# Patient Record
Sex: FEMALE | Race: OTHER | URBAN - METROPOLITAN AREA
[De-identification: names, ages, dates, MRNs, and addresses within clinical notes are randomized per-mention and may not be internally consistent; named-entity substitution may affect disease eponyms.]

---

## 2023-09-10 ENCOUNTER — OFFICE VISIT (OUTPATIENT)
Dept: FAMILY MEDICINE CLINIC | Facility: CLINIC | Age: 23
End: 2023-09-10
Payer: COMMERCIAL

## 2023-09-10 VITALS
RESPIRATION RATE: 18 BRPM | WEIGHT: 185 LBS | OXYGEN SATURATION: 100 % | HEART RATE: 95 BPM | HEIGHT: 62 IN | TEMPERATURE: 100 F | BODY MASS INDEX: 34.04 KG/M2 | DIASTOLIC BLOOD PRESSURE: 95 MMHG | SYSTOLIC BLOOD PRESSURE: 137 MMHG

## 2023-09-10 DIAGNOSIS — H65.93 MEE (MIDDLE EAR EFFUSION), BILATERAL: ICD-10-CM

## 2023-09-10 DIAGNOSIS — H92.02 OTALGIA OF LEFT EAR: Primary | ICD-10-CM

## 2023-09-10 PROCEDURE — 3075F SYST BP GE 130 - 139MM HG: CPT

## 2023-09-10 PROCEDURE — 99203 OFFICE O/P NEW LOW 30 MIN: CPT

## 2023-09-10 PROCEDURE — 3008F BODY MASS INDEX DOCD: CPT

## 2023-09-10 PROCEDURE — 3080F DIAST BP >= 90 MM HG: CPT

## 2023-09-10 RX ORDER — PHENTERMINE HYDROCHLORIDE 15 MG/1
15 CAPSULE ORAL DAILY
COMMUNITY

## 2023-09-10 RX ORDER — NORGESTIMATE AND ETHINYL ESTRADIOL 7DAYSX3 28
KIT ORAL
COMMUNITY

## 2023-09-13 ENCOUNTER — OFFICE VISIT (OUTPATIENT)
Dept: FAMILY MEDICINE CLINIC | Facility: CLINIC | Age: 23
End: 2023-09-13
Payer: COMMERCIAL

## 2023-09-13 VITALS
DIASTOLIC BLOOD PRESSURE: 96 MMHG | RESPIRATION RATE: 18 BRPM | OXYGEN SATURATION: 96 % | BODY MASS INDEX: 34.04 KG/M2 | SYSTOLIC BLOOD PRESSURE: 126 MMHG | HEIGHT: 62 IN | HEART RATE: 92 BPM | TEMPERATURE: 97 F | WEIGHT: 185 LBS

## 2023-09-13 DIAGNOSIS — H69.93 EUSTACHIAN TUBE DYSFUNCTION, BILATERAL: Primary | ICD-10-CM

## 2023-09-13 PROCEDURE — 3074F SYST BP LT 130 MM HG: CPT | Performed by: NURSE PRACTITIONER

## 2023-09-13 PROCEDURE — 99213 OFFICE O/P EST LOW 20 MIN: CPT | Performed by: NURSE PRACTITIONER

## 2023-09-13 PROCEDURE — 3080F DIAST BP >= 90 MM HG: CPT | Performed by: NURSE PRACTITIONER

## 2023-09-13 PROCEDURE — 3008F BODY MASS INDEX DOCD: CPT | Performed by: NURSE PRACTITIONER

## 2023-09-13 RX ORDER — METHYLPREDNISOLONE 4 MG/1
TABLET ORAL
Qty: 1 EACH | Refills: 0 | Status: SHIPPED | OUTPATIENT
Start: 2023-09-13

## 2023-11-02 ENCOUNTER — HOSPITAL ENCOUNTER (EMERGENCY)
Facility: HOSPITAL | Age: 23
Discharge: HOME | End: 2023-11-02
Attending: EMERGENCY MEDICINE
Payer: COMMERCIAL

## 2023-11-02 ENCOUNTER — APPOINTMENT (OUTPATIENT)
Dept: CT IMAGING | Facility: HOSPITAL | Age: 23
End: 2023-11-02
Attending: EMERGENCY MEDICINE
Payer: COMMERCIAL

## 2023-11-02 ENCOUNTER — OFFICE VISIT (OUTPATIENT)
Dept: FAMILY MEDICINE CLINIC | Facility: CLINIC | Age: 23
End: 2023-11-02
Payer: COMMERCIAL

## 2023-11-02 VITALS
RESPIRATION RATE: 21 BRPM | WEIGHT: 194 LBS | OXYGEN SATURATION: 98 % | HEART RATE: 119 BPM | TEMPERATURE: 99 F | BODY MASS INDEX: 35 KG/M2 | DIASTOLIC BLOOD PRESSURE: 101 MMHG | SYSTOLIC BLOOD PRESSURE: 141 MMHG

## 2023-11-02 VITALS
HEIGHT: 62 IN | SYSTOLIC BLOOD PRESSURE: 130 MMHG | HEART RATE: 112 BPM | RESPIRATION RATE: 16 BRPM | OXYGEN SATURATION: 97 % | WEIGHT: 194 LBS | TEMPERATURE: 100 F | BODY MASS INDEX: 35.7 KG/M2 | DIASTOLIC BLOOD PRESSURE: 90 MMHG

## 2023-11-02 DIAGNOSIS — N72 CERVICITIS: Primary | ICD-10-CM

## 2023-11-02 DIAGNOSIS — N39.0 URINARY TRACT INFECTION WITH FEVER: Primary | ICD-10-CM

## 2023-11-02 DIAGNOSIS — R10.9 ACUTE LEFT FLANK PAIN: ICD-10-CM

## 2023-11-02 DIAGNOSIS — R39.9 URINARY SYMPTOM OR SIGN: ICD-10-CM

## 2023-11-02 DIAGNOSIS — R03.0 ELEVATED BLOOD PRESSURE READING: ICD-10-CM

## 2023-11-02 DIAGNOSIS — R30.0 DYSURIA: ICD-10-CM

## 2023-11-02 LAB
ANION GAP SERPL CALC-SCNC: 9 MMOL/L (ref 0–18)
APPEARANCE: CLEAR
B-HCG UR QL: NEGATIVE
BASOPHILS # BLD AUTO: 0.03 X10(3) UL (ref 0–0.2)
BASOPHILS NFR BLD AUTO: 0.4 %
BILIRUB UR QL: NEGATIVE
BILIRUBIN: NEGATIVE
BUN BLD-MCNC: <5 MG/DL (ref 9–23)
CALCIUM BLD-MCNC: 9.3 MG/DL (ref 8.7–10.4)
CHLORIDE SERPL-SCNC: 104 MMOL/L (ref 98–112)
CLARITY UR: CLEAR
CO2 SERPL-SCNC: 25 MMOL/L (ref 21–32)
CREAT BLD-MCNC: 0.77 MG/DL
DEPRECATED RDW RBC AUTO: 40 FL (ref 35.1–46.3)
EGFRCR SERPLBLD CKD-EPI 2021: 110 ML/MIN/1.73M2 (ref 60–?)
EOSINOPHIL # BLD AUTO: 0.02 X10(3) UL (ref 0–0.7)
EOSINOPHIL NFR BLD AUTO: 0.3 %
ERYTHROCYTE [DISTWIDTH] IN BLOOD BY AUTOMATED COUNT: 13 % (ref 11–15)
GLUCOSE (URINE DIPSTICK): NEGATIVE MG/DL
GLUCOSE BLD-MCNC: 88 MG/DL (ref 70–99)
GLUCOSE UR-MCNC: NORMAL MG/DL
HCT VFR BLD AUTO: 39.7 %
HGB BLD-MCNC: 13.2 G/DL
IMM GRANULOCYTES # BLD AUTO: 0.01 X10(3) UL (ref 0–1)
IMM GRANULOCYTES NFR BLD: 0.1 %
KETONES (URINE DIPSTICK): NEGATIVE MG/DL
KETONES UR-MCNC: NEGATIVE MG/DL
LEUKOCYTE ESTERASE UR QL STRIP.AUTO: 500
LYMPHOCYTES # BLD AUTO: 1.46 X10(3) UL (ref 1–4)
LYMPHOCYTES NFR BLD AUTO: 20 %
MCH RBC QN AUTO: 28 PG (ref 26–34)
MCHC RBC AUTO-ENTMCNC: 33.2 G/DL (ref 31–37)
MCV RBC AUTO: 84.1 FL
MONOCYTES # BLD AUTO: 0.83 X10(3) UL (ref 0.1–1)
MONOCYTES NFR BLD AUTO: 11.4 %
MULTISTIX LOT#: ABNORMAL NUMERIC
NEUTROPHILS # BLD AUTO: 4.96 X10 (3) UL (ref 1.5–7.7)
NEUTROPHILS # BLD AUTO: 4.96 X10(3) UL (ref 1.5–7.7)
NEUTROPHILS NFR BLD AUTO: 67.8 %
NITRITE UR QL STRIP.AUTO: NEGATIVE
NITRITE, URINE: NEGATIVE
PH UR: 6.5 [PH] (ref 5–8)
PH, URINE: 6.5 (ref 4.5–8)
PLATELET # BLD AUTO: 276 10(3)UL (ref 150–450)
POTASSIUM SERPL-SCNC: 3.8 MMOL/L (ref 3.5–5.1)
PROT UR-MCNC: NEGATIVE MG/DL
PROTEIN (URINE DIPSTICK): NEGATIVE MG/DL
RBC # BLD AUTO: 4.72 X10(6)UL
SODIUM SERPL-SCNC: 138 MMOL/L (ref 136–145)
SP GR UR STRIP: 1.01 (ref 1–1.03)
SPECIFIC GRAVITY: <=1.005 (ref 1–1.03)
URINE-COLOR: YELLOW
UROBILINOGEN UR STRIP-ACNC: NORMAL
UROBILINOGEN,SEMI-QN: 0.2 MG/DL (ref 0–1.9)
WBC # BLD AUTO: 7.3 X10(3) UL (ref 4–11)

## 2023-11-02 PROCEDURE — 96360 HYDRATION IV INFUSION INIT: CPT

## 2023-11-02 PROCEDURE — 99284 EMERGENCY DEPT VISIT MOD MDM: CPT

## 2023-11-02 PROCEDURE — 96372 THER/PROPH/DIAG INJ SC/IM: CPT

## 2023-11-02 PROCEDURE — 87491 CHLMYD TRACH DNA AMP PROBE: CPT | Performed by: EMERGENCY MEDICINE

## 2023-11-02 PROCEDURE — 87591 N.GONORRHOEAE DNA AMP PROB: CPT | Performed by: EMERGENCY MEDICINE

## 2023-11-02 PROCEDURE — 80048 BASIC METABOLIC PNL TOTAL CA: CPT | Performed by: EMERGENCY MEDICINE

## 2023-11-02 PROCEDURE — 74176 CT ABD & PELVIS W/O CONTRAST: CPT | Performed by: EMERGENCY MEDICINE

## 2023-11-02 PROCEDURE — 96361 HYDRATE IV INFUSION ADD-ON: CPT

## 2023-11-02 PROCEDURE — 81025 URINE PREGNANCY TEST: CPT

## 2023-11-02 PROCEDURE — 99285 EMERGENCY DEPT VISIT HI MDM: CPT

## 2023-11-02 PROCEDURE — 85025 COMPLETE CBC W/AUTO DIFF WBC: CPT | Performed by: EMERGENCY MEDICINE

## 2023-11-02 PROCEDURE — 81001 URINALYSIS AUTO W/SCOPE: CPT | Performed by: EMERGENCY MEDICINE

## 2023-11-02 RX ORDER — DOXYCYCLINE HYCLATE 100 MG/1
100 CAPSULE ORAL 2 TIMES DAILY
Qty: 14 CAPSULE | Refills: 0 | Status: SHIPPED | OUTPATIENT
Start: 2023-11-02 | End: 2023-11-09

## 2023-11-02 NOTE — DISCHARGE INSTRUCTIONS
Take antibiotics as directed  Use condoms  Follow-up with your gynecologist  Return if increased pain fever vomiting

## 2023-11-02 NOTE — ED INITIAL ASSESSMENT (HPI)
Patient with c/o dysuria, urinary urgency, urinary frequency since Monday. Patient states she is now having left flank pain and fever.

## 2023-11-03 LAB
C TRACH DNA SPEC QL NAA+PROBE: NEGATIVE
N GONORRHOEA DNA SPEC QL NAA+PROBE: NEGATIVE

## 2023-11-05 ENCOUNTER — TELEPHONE (OUTPATIENT)
Dept: FAMILY MEDICINE CLINIC | Facility: CLINIC | Age: 23
End: 2023-11-05

## 2023-11-05 DIAGNOSIS — Z02.9 ENCOUNTERS FOR ADMINISTRATIVE PURPOSE: Primary | ICD-10-CM

## 2023-11-05 NOTE — TELEPHONE ENCOUNTER
Patient called [phone] to get work note for 11/2/23 and 11/3/23. Seen on 11/2/23, states still had fever on 11/3/23 and requesting work note for 2nd and 3rd. Note provided.

## 2024-02-14 ENCOUNTER — OFFICE VISIT (OUTPATIENT)
Dept: FAMILY MEDICINE CLINIC | Facility: CLINIC | Age: 24
End: 2024-02-14
Payer: COMMERCIAL

## 2024-02-14 VITALS
RESPIRATION RATE: 16 BRPM | BODY MASS INDEX: 36.44 KG/M2 | SYSTOLIC BLOOD PRESSURE: 124 MMHG | OXYGEN SATURATION: 96 % | TEMPERATURE: 99 F | HEART RATE: 96 BPM | DIASTOLIC BLOOD PRESSURE: 90 MMHG | WEIGHT: 198 LBS | HEIGHT: 62 IN

## 2024-02-14 DIAGNOSIS — J02.9 SORE THROAT: ICD-10-CM

## 2024-02-14 DIAGNOSIS — H69.91 EUSTACHIAN TUBE DISORDER, RIGHT: Primary | ICD-10-CM

## 2024-02-14 LAB
CONTROL LINE PRESENT WITH A CLEAR BACKGROUND (YES/NO): YES YES/NO
KIT LOT #: NORMAL NUMERIC
STREP GRP A CUL-SCR: NEGATIVE

## 2024-02-14 PROCEDURE — 3008F BODY MASS INDEX DOCD: CPT | Performed by: NURSE PRACTITIONER

## 2024-02-14 PROCEDURE — 87880 STREP A ASSAY W/OPTIC: CPT | Performed by: NURSE PRACTITIONER

## 2024-02-14 PROCEDURE — 99213 OFFICE O/P EST LOW 20 MIN: CPT | Performed by: NURSE PRACTITIONER

## 2024-02-14 PROCEDURE — 3080F DIAST BP >= 90 MM HG: CPT | Performed by: NURSE PRACTITIONER

## 2024-02-14 PROCEDURE — 3074F SYST BP LT 130 MM HG: CPT | Performed by: NURSE PRACTITIONER

## 2024-02-14 RX ORDER — FLUTICASONE PROPIONATE 50 MCG
2 SPRAY, SUSPENSION (ML) NASAL DAILY
Qty: 1 EACH | Refills: 0 | Status: SHIPPED | OUTPATIENT
Start: 2024-02-14

## 2024-02-14 RX ORDER — CETIRIZINE HYDROCHLORIDE 10 MG/1
10 TABLET ORAL DAILY
Qty: 30 TABLET | Refills: 0 | Status: SHIPPED | OUTPATIENT
Start: 2024-02-14

## 2024-02-14 RX ORDER — NAPROXEN 500 MG/1
500 TABLET ORAL 2 TIMES DAILY WITH MEALS
COMMUNITY
Start: 2024-01-31 | End: 2024-03-01

## 2024-02-14 NOTE — PROGRESS NOTES
CHIEF COMPLAINT:     Chief Complaint   Patient presents with    Sore Throat     ST last week Tuesday, gone by Oksana Weeks returned yesterday and now has runny nose as well, R lymph nodes swollen   Denies fever, body aches, chills  Nothing OTC  No recent Covid test        HPI:   Nathalie Harper is a 23 year old female who presents for upper respiratory symptoms for  1 days. Patient reports sore throat, congestion, ear pain, denies fever, denies cough. Symptoms have been lingering since onset.  Treating symptoms with nothing.  No COVID testing art home, received COVID vaccines. Right ear pressure.     Current Outpatient Medications   Medication Sig Dispense Refill    naproxen 500 MG Oral Tab Take 1 tablet (500 mg total) by mouth 2 (two) times daily with meals.      fluticasone propionate 50 MCG/ACT Nasal Suspension 2 sprays by Each Nare route daily. 1 each 0    cetirizine 10 MG Oral Tab Take 1 tablet (10 mg total) by mouth daily. 30 tablet 0    TRI-SPRINTEC 0.18/0.215/0.25 MG-35 MCG Oral Tab       Phentermine HCl 15 MG Oral Cap Take 1 capsule (15 mg total) by mouth daily.        History reviewed. No pertinent past medical history.   History reviewed. No pertinent surgical history.      Social History     Socioeconomic History    Marital status: Single   Tobacco Use    Smoking status: Never         REVIEW OF SYSTEMS:   GENERAL: normal appetite  SKIN: no rashes or abnormal skin lesions  HEENT: See HPI  LUNGS: See HPI  CARDIOVASCULAR: denies chest pain or palpitations   GI: denies N/V/C or abdominal pain      EXAM:   /90   Pulse 96   Temp 99.3 °F (37.4 °C)   Resp 16   Ht 5' 2\" (1.575 m)   Wt 198 lb (89.8 kg)   LMP 01/31/2024   SpO2 96%   BMI 36.21 kg/m²   GENERAL: well developed, well nourished,in no apparent distress  SKIN: no rashes,no suspicious lesions  HEAD: atraumatic, normocephalic.  No tenderness on palpation of  sinuses  EYES: conjunctiva clear, EOM intact  EARS: TM's WNL, no bulging, no retraction,  positive fluid, bony landmarks present  NOSE: Nostrils patent, no nasal discharge, nasal mucosa    THROAT: Oral mucosa pink, moist. Posterior pharynx is non erythematous. no exudates. Tonsils 1/4.    NECK: Supple, non-tender  LUNGS: clear to auscultation bilaterally, no wheezes or rhonchi. Breathing is non labored.  CARDIO: RRR without murmur  EXTREMITIES: no cyanosis, clubbing or edema  LYMPH:  No lymphadenopathy.        ASSESSMENT AND PLAN:   Nathalie Harper is a 23 year old female who presents with upper respiratory symptoms that are consistent with    ASSESSMENT:   Encounter Diagnoses   Name Primary?    Eustachian tube disorder, right Yes    Sore throat        PLAN: Negative rapid strep. Exam not consistent with strep throat. Discussed eustachian tube dysfunction, recommend starting daily Flonase and zyrtec to reduce fluid and allow for better drainage. Meds as below.  Comfort care as described in Patient Instructions    Meds & Refills for this Visit:  Requested Prescriptions     Signed Prescriptions Disp Refills    fluticasone propionate 50 MCG/ACT Nasal Suspension 1 each 0     Si sprays by Each Nare route daily.    cetirizine 10 MG Oral Tab 30 tablet 0     Sig: Take 1 tablet (10 mg total) by mouth daily.     Risks, benefits, and side effects of medication explained and discussed.    The patient indicates understanding of these issues and agrees to the plan.  The patient is asked to f/u with PCP if sx's persist or worsen.      Patient Instructions   Eustachian tube problems  Written by the doctors and editors at [employer]     What is the eustachian tube? -- The eustachian tube is a tube that connects the middle ear (the part of the ear behind the eardrum) to the back of the nose and throat       Normally, the eustachian tube helps keep the air pressure inside the middle ear the same as the air pressure outside the middle ear. If there is a problem with the eustachian tube, the air pressure inside the middle  ear won’t be the same as the air pressure outside it. This can damage the middle ear and cause ear pain, hearing loss, and other symptoms. “Ear barotrauma” is the medical term for when people have symptoms or damage in the middle ear because of air pressure differences.    Most eustachian tube problems are not serious. They usually last only a short time and get better on their own.    But eustachian tube problems sometimes lead to serious problems, such as:    ?A middle ear infection  ?A torn eardrum  ?Hearing loss  Long-term hearing loss from eustachian tube problems can also lead to language or speech problems in children.    What causes eustachian tube problems? -- Common causes of eustachian tube problems are:    ?Illnesses or conditions that make the eustachian tubes swollen or inflamed - These include colds, allergies, ear infections, or sinus infections. The sinuses are hollow areas in the bones of the face.  ?Sudden air pressure changes - Sudden air pressure changes can happen when people fly in an airplane, scuba dive, or drive in the mountains.  ?Growths that block the eustachian tube  ?Being born with an abnormal eustachian tube    What are the symptoms of a eustachian tube problem? -- Common symptoms of a eustachian tube problem include:    ?Ear pain  ?Feeling pressure or fullness in the ear  ?Trouble hearing  ?Ringing in the ear  ?Feeling dizzy  Should I see a doctor or nurse? -- See your doctor or nurse if your symptoms are severe, get worse, or if they don’t go away after a few days.    Will I need tests? -- Probably not. Your doctor or nurse should be able to tell if you have a eustachian tube problem by learning about your symptoms and doing an exam.    If your symptoms are severe or last for a long time, your doctor or nurse might:    ?Have you see a special kind of doctor called an ear, nose, and throat doctor  ?Do tests to check your hearing  ?Do an imaging test - Imaging tests can create  pictures of the inside of the body.    How are eustachian tube problems treated? -- Treatment depends on what’s causing the eustachian tube problem. Depending on your individual situation, your doctor might treat you with one or more of the following:    ?Nose sprays   -Nasal steroids such as Flonase or Nasonex   - OTC nasal sprays like Afrin (oxymetazoline) can be used every 12 hours for  NO MORE than 3 days.  Longer use leads to worsening congestion and blood  pressure issues. This medication should be avoided in patients with high blood  pressure  ?Antihistamines - These medicines are usually used to treat allergies. They help stop itching, sneezing, and runny nose symptoms. Examples: Claritin, Allegra, Zyrtec.   ?Decongestants - These medicines can help with stuffy nose symptoms. These should not be used if patient has underlying blood pressure issues (high blood pressure etc.)   -Sudafed (pseudoephedrine)- according to package instructions.     ?Antibiotic medicines - Antibiotics are not needed to treat eustachian tube problems. But if you have an infection caused by bacteria in addition to your eustachian tube problems, your doctor can treat it with antibiotics.  ?Surgery - Most people do not need surgery for eustachian tube problems. But people might need surgery if their symptoms don’t get better with medicines or they have severe or long-term symptoms.

## 2024-02-14 NOTE — PATIENT INSTRUCTIONS
Eustachian tube problems  Written by the doctors and editors at [facility]     What is the eustachian tube? -- The eustachian tube is a tube that connects the middle ear (the part of the ear behind the eardrum) to the back of the nose and throat       Normally, the eustachian tube helps keep the air pressure inside the middle ear the same as the air pressure outside the middle ear. If there is a problem with the eustachian tube, the air pressure inside the middle ear won’t be the same as the air pressure outside it. This can damage the middle ear and cause ear pain, hearing loss, and other symptoms. “Ear barotrauma” is the medical term for when people have symptoms or damage in the middle ear because of air pressure differences.    Most eustachian tube problems are not serious. They usually last only a short time and get better on their own.    But eustachian tube problems sometimes lead to serious problems, such as:    ?A middle ear infection  ?A torn eardrum  ?Hearing loss  Long-term hearing loss from eustachian tube problems can also lead to language or speech problems in children.    What causes eustachian tube problems? -- Common causes of eustachian tube problems are:    ?Illnesses or conditions that make the eustachian tubes swollen or inflamed - These include colds, allergies, ear infections, or sinus infections. The sinuses are hollow areas in the bones of the face.  ?Sudden air pressure changes - Sudden air pressure changes can happen when people fly in an airplane, scuba dive, or drive in the mountains.  ?Growths that block the eustachian tube  ?Being born with an abnormal eustachian tube    What are the symptoms of a eustachian tube problem? -- Common symptoms of a eustachian tube problem include:    ?Ear pain  ?Feeling pressure or fullness in the ear  ?Trouble hearing  ?Ringing in the ear  ?Feeling dizzy  Should I see a doctor or nurse? -- See your doctor or nurse if your symptoms are severe, get worse,  or if they don’t go away after a few days.    Will I need tests? -- Probably not. Your doctor or nurse should be able to tell if you have a eustachian tube problem by learning about your symptoms and doing an exam.    If your symptoms are severe or last for a long time, your doctor or nurse might:    ?Have you see a special kind of doctor called an ear, nose, and throat doctor  ?Do tests to check your hearing  ?Do an imaging test - Imaging tests can create pictures of the inside of the body.    How are eustachian tube problems treated? -- Treatment depends on what’s causing the eustachian tube problem. Depending on your individual situation, your doctor might treat you with one or more of the following:    ?Nose sprays   -Nasal steroids such as Flonase or Nasonex   - OTC nasal sprays like Afrin (oxymetazoline) can be used every 12 hours for  NO MORE than 3 days.  Longer use leads to worsening congestion and blood  pressure issues. This medication should be avoided in patients with high blood  pressure  ?Antihistamines - These medicines are usually used to treat allergies. They help stop itching, sneezing, and runny nose symptoms. Examples: Claritin, Allegra, Zyrtec.   ?Decongestants - These medicines can help with stuffy nose symptoms. These should not be used if patient has underlying blood pressure issues (high blood pressure etc.)   -Sudafed (pseudoephedrine)- according to package instructions.     ?Antibiotic medicines - Antibiotics are not needed to treat eustachian tube problems. But if you have an infection caused by bacteria in addition to your eustachian tube problems, your doctor can treat it with antibiotics.  ?Surgery - Most people do not need surgery for eustachian tube problems. But people might need surgery if their symptoms don’t get better with medicines or they have severe or long-term symptoms.

## 2024-06-24 ENCOUNTER — HOSPITAL ENCOUNTER (OUTPATIENT)
Age: 24
Discharge: HOME | End: 2024-06-24

## 2024-06-24 VITALS
SYSTOLIC BLOOD PRESSURE: 150 MMHG | HEART RATE: 107 BPM | DIASTOLIC BLOOD PRESSURE: 102 MMHG | OXYGEN SATURATION: 99 % | TEMPERATURE: 97 F | RESPIRATION RATE: 19 BRPM

## 2024-06-24 DIAGNOSIS — U07.1 COVID: Primary | ICD-10-CM

## 2024-06-24 LAB
S PYO AG THROAT QL IA.RAPID: NEGATIVE
SARS-COV-2 RNA RESP QL NAA+PROBE: DETECTED

## 2024-06-24 PROCEDURE — 99213 OFFICE O/P EST LOW 20 MIN: CPT

## 2024-06-24 PROCEDURE — 99212 OFFICE O/P EST SF 10 MIN: CPT

## 2024-06-24 PROCEDURE — 87651 STREP A DNA AMP PROBE: CPT | Performed by: NURSE PRACTITIONER

## 2024-06-24 NOTE — ED PROVIDER NOTES
Patient Seen in: [facility]      History     Chief Complaint   Patient presents with    Cough     Start of Bronchitis possibly?Neck pain - Entered by patient    Sore Throat     Stated Complaint: Cough - Start of Bronchitis possibly?    Subjective:   HPI    24-year-old female presents with scratchy throat and cough that started last night.  She is afebrile.  She appears in no acute distress.    Objective:   No pertinent past medical history.            No pertinent past surgical history.              No pertinent social history.            Review of Systems    Positive for stated complaint: Cough - Start of Bronchitis possibly?  Other systems are as noted in HPI.  Constitutional and vital signs reviewed.      All other systems reviewed and negative except as noted above.    Physical Exam     ED Triage Vitals [06/24/24 1534]   BP (!) 150/102   Pulse 107   Resp 19   Temp 97.4 °F (36.3 °C)   Temp src Temporal   SpO2 99 %   O2 Device (S) None (Room air)       Current Vitals:   Vital Signs  BP: (!) 150/102  Pulse: 107  Resp: 19  Temp: 97.4 °F (36.3 °C)  Temp src: Temporal    Oxygen Therapy  SpO2: 99 %  O2 Device: (S) None (Room air)            Physical Exam  Vitals reviewed.   Constitutional:       General: She is not in acute distress.     Appearance: She is not ill-appearing.   HENT:      Right Ear: Tympanic membrane and ear canal normal.      Left Ear: Tympanic membrane and ear canal normal.      Mouth/Throat:      Mouth: Mucous membranes are moist. No oral lesions.      Pharynx: Uvula midline. Posterior oropharyngeal erythema present. No pharyngeal swelling, oropharyngeal exudate or uvula swelling.      Tonsils: No tonsillar exudate or tonsillar abscesses.   Cardiovascular:      Rate and Rhythm: Regular rhythm. Tachycardia present.   Pulmonary:      Effort: Pulmonary effort is normal.      Breath sounds: Normal breath sounds.   Musculoskeletal:      Cervical back: Normal range of motion and neck supple.    Lymphadenopathy:      Cervical: No cervical adenopathy.   Skin:     General: Skin is warm and dry.   Neurological:      General: No focal deficit present.      Mental Status: She is alert and oriented to person, place, and time.   Psychiatric:         Mood and Affect: Mood normal.         Behavior: Behavior normal.               ED Course     Labs Reviewed   RAPID SARS-COV-2 BY PCR - Abnormal; Notable for the following components:       Result Value    Rapid SARS-CoV-2 by PCR Detected (*)     All other components within normal limits   RAPID STREP A - Normal                      MDM                                         Medical Decision Making  24-year-old female presents with sore throat and cough that started last night.  Differential diagnosis includes viral upper respiratory infection, strep pharyngitis, viral pharyngitis, influenza, COVID.  Patient has unremarkable exam.  POC strep is negative.  POC COVID is positive.  Results were discussed with patient.  She was given printed instructions for care of COVID symptoms.  She was also given a work note to return July 1.    Amount and/or Complexity of Data Reviewed  Labs: ordered.     Details: POC covid is positive  POC strep is negative    Risk  OTC drugs.        Disposition and Plan     Clinical Impression:  1. COVID         Disposition:  Discharge  6/24/2024  4:25 pm    Follow-up:  Aamir Botello  [address] [place] [id number] [phone]      If symptoms worsen          Medications Prescribed:  Discharge Medication List as of 6/24/2024  4:26 PM

## 2024-09-05 ENCOUNTER — HOSPITAL ENCOUNTER (OUTPATIENT)
Dept: CT IMAGING | Age: 24
Discharge: HOME | End: 2024-09-05
Attending: OBSTETRICS & GYNECOLOGY
Payer: COMMERCIAL

## 2024-09-05 DIAGNOSIS — R10.9 ABDOMINAL PAIN: ICD-10-CM

## 2024-09-05 LAB
CREAT BLD-MCNC: 0.8 MG/DL
EGFRCR SERPLBLD CKD-EPI 2021: 105 ML/MIN/1.73M2 (ref 60–?)

## 2024-09-05 PROCEDURE — 74178 CT ABD&PLV WO CNTR FLWD CNTR: CPT | Performed by: OBSTETRICS & GYNECOLOGY

## 2024-09-05 PROCEDURE — 82565 ASSAY OF CREATININE: CPT

## 2024-10-04 PROCEDURE — 88184 FLOWCYTOMETRY/ TC 1 MARKER: CPT | Performed by: SURGERY

## 2024-10-04 PROCEDURE — 88185 FLOWCYTOMETRY/TC ADD-ON: CPT | Performed by: SURGERY

## 2024-10-07 ENCOUNTER — LAB REQUISITION (OUTPATIENT)
Age: 24
End: 2024-10-07
Payer: COMMERCIAL

## 2024-10-07 DIAGNOSIS — R59.0 INGUINAL LYMPHADENOPATHY: ICD-10-CM

## 2024-10-09 LAB
CD10 CELLS NFR SPEC: <1 %
CD10/CD19: <1 %
CD19 CELLS NFR SPEC: 38 %
CD19+/CD200+: 7 %
CD2 CELLS NFR SPEC: 60 %
CD20 CELLS NFR SPEC: 38 %
CD200 CELLS: 8 %
CD3 CELLS NFR SPEC: 58 %
CD3+/TCRGD+: 2 %
CD3+CD4+ CELLS NFR SPEC: 46 %
CD3+CD4+ CELLS/CD3+CD8+ CLL SPEC: 4.2
CD3+CD8+ CELLS NFR SPEC: 11 %
CD3-/CD56+: 1 %
CD34 CELLS NFR SPEC: <1 %
CD38 CELLS NFR SPEC: <1 %
CD38+/CD19+: <1 %
CD45 CELLS NFR SPEC: 100 %
CD5 CELLS NFR SPEC: 70 %
CD5/CD19 CELLS: 11 %
CD7 CELLS NFR SPEC: 59 %
CELL SURF KAPPA/LAMBDA RATIO: 1.7
CELL SURF LAMBDA LIGHT CHAIN: 14 %
CELL SURFACE KAPPA LIGHT CHAIN: 24 %
TCR G-D CELLS NFR SPEC: 3 %

## 2025-05-02 ENCOUNTER — APPOINTMENT (OUTPATIENT)
Dept: OTHER | Facility: HOSPITAL | Age: 25
End: 2025-05-02
Attending: FAMILY MEDICINE